# Patient Record
Sex: FEMALE | Race: WHITE | Employment: FULL TIME | URBAN - NONMETROPOLITAN AREA
[De-identification: names, ages, dates, MRNs, and addresses within clinical notes are randomized per-mention and may not be internally consistent; named-entity substitution may affect disease eponyms.]

---

## 2023-01-26 NOTE — H&P
[address] [place] [address]                       PREOPERATIVE HISTORY AND PHYSICAL    PATIENT NAME: Ino Madrid                       :        1975  MED REC NO:   [id number]                           ROOM:  ACCOUNT NO:   [de-identified]                           ADMIT DATE: 02/10/2023  PROVIDER:     Michaela Orosco. Erik Agrawal MD    Today's date 2023, 07:41 a.m. PLANNED PROCEDURES:  1. Right insertional Achilles tendonitis debridement repair. 2.  Right Ovidio's deformity resection. 3.  Right posterior calcaneal spur excision. 4.  Possible FHL tendon transfer to the calcaneus. PREOPERATIVE DIAGNOSES:  1. Right insertional Achilles tendonitis/tendinosis. 2.  Right Ovidio's deformity. 3.  Right posterior calcaneal spur. HISTORY:  A 52year-old with recalcitrant insertional Achilles tendon  pain, elected to proceed with surgery. PAST MEDICAL HISTORY:  Positive hypertension, positive nephrogenic  diabetes insipidus. MEDICATIONS:  Multiple. See chart for details. ALLERGIES:  PENICILLIN AND SULFA. NO ALLERGY TO METAL, LATEX. PAST SURGICAL HISTORY:  Multiple including two C-sections, lobectomy,  and appendectomy. REVIEW OF SYSTEMS:  MUSCULOSKELETAL:  Positive for swelling. NEUROLOGIC:  Negative numbness, tingling. SOCIAL HISTORY:  Negative smoking. FAMILY HISTORY:  Positive osteoarthritis and diabetes. PHYSICAL EXAMINATION:  VITAL SIGNS:  5 feet 4 inches. GENERAL APPEARANCE:  A well-developed, well-nourished female. Mood and  affect appropriate. Alert and oriented x3. HEAD, NECK, CHEST, ABDOMEN:  No obvious deformity. EXTREMITIES:  Right lower extremity neurovascularly intact. Tender  insertional Achilles tendon. IMPRESSION:  As above. PLAN:  1. To proceed with surgery as above. 2.  Postop instructions given.   3.  Percocet postop pain as well as Xarelto for DVT prophylaxis, sent to  the pharmacy and discussed with the patient. 4.  Followup appointments made. 5.  Postop instructions given. 6.  Nonweightbearing following the surgery. 7.  Resume preop diet. 8.  General with popliteal block, continue if available. 9.  Call for problems, questions, concerns.         Jordan Victoria MD    D: 01/26/2023 7:54:57       T: 01/26/2023 7:58:27     [email or web address]  Job#: [id number]     Doc#: [id number]

## 2023-02-03 NOTE — PROGRESS NOTES
PAT call attempted, patient unavailable, left message to please call us back at your earliest convenience; [phone]

## 2023-02-08 ENCOUNTER — ANESTHESIA EVENT (OUTPATIENT)
Dept: OPERATING ROOM | Age: 48
End: 2023-02-08
Payer: COMMERCIAL

## 2023-02-10 ENCOUNTER — HOSPITAL ENCOUNTER (OUTPATIENT)
Age: 48
Setting detail: OUTPATIENT SURGERY
Discharge: HOME | End: 2023-02-10
Attending: ORTHOPAEDIC SURGERY | Admitting: ORTHOPAEDIC SURGERY
Payer: COMMERCIAL

## 2023-02-10 ENCOUNTER — ANESTHESIA (OUTPATIENT)
Dept: OPERATING ROOM | Age: 48
End: 2023-02-10
Payer: COMMERCIAL

## 2023-02-10 VITALS
HEART RATE: 72 BPM | DIASTOLIC BLOOD PRESSURE: 55 MMHG | SYSTOLIC BLOOD PRESSURE: 103 MMHG | OXYGEN SATURATION: 99 % | TEMPERATURE: 97.2 F | RESPIRATION RATE: 13 BRPM | BODY MASS INDEX: 36.37 KG/M2 | HEIGHT: 64 IN | WEIGHT: 213 LBS

## 2023-02-10 PROCEDURE — 7100000010 HC PHASE II RECOVERY - FIRST 15 MIN: Performed by: ORTHOPAEDIC SURGERY

## 2023-02-10 PROCEDURE — 64445 NJX AA&/STRD SCIATIC NRV IMG: CPT | Performed by: ANESTHESIOLOGY

## 2023-02-10 PROCEDURE — 6360000002 HC RX W HCPCS: Performed by: ANESTHESIOLOGY

## 2023-02-10 PROCEDURE — 3600000013 HC SURGERY LEVEL 3 ADDTL 15MIN: Performed by: ORTHOPAEDIC SURGERY

## 2023-02-10 PROCEDURE — 6360000002 HC RX W HCPCS: Performed by: ORTHOPAEDIC SURGERY

## 2023-02-10 PROCEDURE — 2709999900 HC NON-CHARGEABLE SUPPLY: Performed by: ORTHOPAEDIC SURGERY

## 2023-02-10 PROCEDURE — 7100000001 HC PACU RECOVERY - ADDTL 15 MIN: Performed by: ORTHOPAEDIC SURGERY

## 2023-02-10 PROCEDURE — 2720000010 HC SURG SUPPLY STERILE: Performed by: ORTHOPAEDIC SURGERY

## 2023-02-10 PROCEDURE — 7100000000 HC PACU RECOVERY - FIRST 15 MIN: Performed by: ORTHOPAEDIC SURGERY

## 2023-02-10 PROCEDURE — 6360000002 HC RX W HCPCS

## 2023-02-10 PROCEDURE — 3700000001 HC ADD 15 MINUTES (ANESTHESIA): Performed by: ORTHOPAEDIC SURGERY

## 2023-02-10 PROCEDURE — 3700000000 HC ANESTHESIA ATTENDED CARE: Performed by: ORTHOPAEDIC SURGERY

## 2023-02-10 PROCEDURE — 2500000003 HC RX 250 WO HCPCS

## 2023-02-10 PROCEDURE — C1713 ANCHOR/SCREW BN/BN,TIS/BN: HCPCS | Performed by: ORTHOPAEDIC SURGERY

## 2023-02-10 PROCEDURE — 6370000000 HC RX 637 (ALT 250 FOR IP): Performed by: ORTHOPAEDIC SURGERY

## 2023-02-10 PROCEDURE — 2580000003 HC RX 258: Performed by: ORTHOPAEDIC SURGERY

## 2023-02-10 PROCEDURE — 7100000011 HC PHASE II RECOVERY - ADDTL 15 MIN: Performed by: ORTHOPAEDIC SURGERY

## 2023-02-10 PROCEDURE — 3600000003 HC SURGERY LEVEL 3 BASE: Performed by: ORTHOPAEDIC SURGERY

## 2023-02-10 RX ORDER — SERTRALINE HYDROCHLORIDE 100 MG/1
150 TABLET, FILM COATED ORAL DAILY
COMMUNITY

## 2023-02-10 RX ORDER — FENTANYL CITRATE 50 UG/ML
25 INJECTION, SOLUTION INTRAMUSCULAR; INTRAVENOUS EVERY 5 MIN PRN
Status: DISCONTINUED | OUTPATIENT
Start: 2023-02-10 | End: 2023-02-10 | Stop reason: HOSPADM

## 2023-02-10 RX ORDER — TIRZEPATIDE 7.5 MG/.5ML
7.5 INJECTION, SOLUTION SUBCUTANEOUS WEEKLY
COMMUNITY

## 2023-02-10 RX ORDER — LEVOTHYROXINE SODIUM 0.1 MG/1
100 TABLET ORAL DAILY
COMMUNITY

## 2023-02-10 RX ORDER — BUPROPION HYDROCHLORIDE 300 MG/1
300 TABLET ORAL EVERY MORNING
COMMUNITY

## 2023-02-10 RX ORDER — HYDROCHLOROTHIAZIDE 50 MG/1
50 TABLET ORAL DAILY
COMMUNITY

## 2023-02-10 RX ORDER — PROPOFOL 10 MG/ML
INJECTION, EMULSION INTRAVENOUS PRN
Status: DISCONTINUED | OUTPATIENT
Start: 2023-02-10 | End: 2023-02-10 | Stop reason: SDUPTHER

## 2023-02-10 RX ORDER — SPIRONOLACTONE 50 MG/1
50 TABLET, FILM COATED ORAL DAILY
COMMUNITY

## 2023-02-10 RX ORDER — GENTAMICIN SULFATE 40 MG/ML
INJECTION, SOLUTION INTRAMUSCULAR; INTRAVENOUS PRN
Status: DISCONTINUED | OUTPATIENT
Start: 2023-02-10 | End: 2023-02-10 | Stop reason: ALTCHOICE

## 2023-02-10 RX ORDER — KETOROLAC TROMETHAMINE 30 MG/ML
INJECTION, SOLUTION INTRAMUSCULAR; INTRAVENOUS PRN
Status: DISCONTINUED | OUTPATIENT
Start: 2023-02-10 | End: 2023-02-10 | Stop reason: SDUPTHER

## 2023-02-10 RX ORDER — GLYCOPYRROLATE 0.2 MG/ML
INJECTION INTRAMUSCULAR; INTRAVENOUS PRN
Status: DISCONTINUED | OUTPATIENT
Start: 2023-02-10 | End: 2023-02-10 | Stop reason: SDUPTHER

## 2023-02-10 RX ORDER — LIDOCAINE HYDROCHLORIDE 20 MG/ML
INJECTION, SOLUTION EPIDURAL; INFILTRATION; INTRACAUDAL; PERINEURAL PRN
Status: DISCONTINUED | OUTPATIENT
Start: 2023-02-10 | End: 2023-02-10 | Stop reason: SDUPTHER

## 2023-02-10 RX ORDER — ERGOCALCIFEROL 1.25 MG/1
5000 CAPSULE ORAL DAILY
COMMUNITY

## 2023-02-10 RX ORDER — METFORMIN HYDROCHLORIDE 500 MG/1
500 TABLET, EXTENDED RELEASE ORAL
COMMUNITY

## 2023-02-10 RX ORDER — FENTANYL CITRATE 50 UG/ML
50 INJECTION, SOLUTION INTRAMUSCULAR; INTRAVENOUS EVERY 5 MIN PRN
Status: DISCONTINUED | OUTPATIENT
Start: 2023-02-10 | End: 2023-02-10 | Stop reason: HOSPADM

## 2023-02-10 RX ORDER — SODIUM CHLORIDE 9 MG/ML
INJECTION, SOLUTION INTRAVENOUS PRN
Status: DISCONTINUED | OUTPATIENT
Start: 2023-02-10 | End: 2023-02-10 | Stop reason: HOSPADM

## 2023-02-10 RX ORDER — METOPROLOL SUCCINATE 25 MG/1
25 TABLET, EXTENDED RELEASE ORAL DAILY
COMMUNITY

## 2023-02-10 RX ORDER — ROCURONIUM BROMIDE 10 MG/ML
INJECTION, SOLUTION INTRAVENOUS PRN
Status: DISCONTINUED | OUTPATIENT
Start: 2023-02-10 | End: 2023-02-10 | Stop reason: SDUPTHER

## 2023-02-10 RX ORDER — SODIUM CHLORIDE 9 MG/ML
INJECTION, SOLUTION INTRAVENOUS CONTINUOUS
Status: DISCONTINUED | OUTPATIENT
Start: 2023-02-10 | End: 2023-02-10 | Stop reason: HOSPADM

## 2023-02-10 RX ORDER — SODIUM CHLORIDE 0.9 % (FLUSH) 0.9 %
5-40 SYRINGE (ML) INJECTION PRN
Status: DISCONTINUED | OUTPATIENT
Start: 2023-02-10 | End: 2023-02-10 | Stop reason: HOSPADM

## 2023-02-10 RX ORDER — ONDANSETRON 2 MG/ML
INJECTION INTRAMUSCULAR; INTRAVENOUS PRN
Status: DISCONTINUED | OUTPATIENT
Start: 2023-02-10 | End: 2023-02-10 | Stop reason: SDUPTHER

## 2023-02-10 RX ORDER — ROPIVACAINE HYDROCHLORIDE 5 MG/ML
INJECTION, SOLUTION EPIDURAL; INFILTRATION; PERINEURAL
Status: COMPLETED | OUTPATIENT
Start: 2023-02-10 | End: 2023-02-10

## 2023-02-10 RX ORDER — DEXAMETHASONE SODIUM PHOSPHATE 10 MG/ML
INJECTION, EMULSION INTRAMUSCULAR; INTRAVENOUS PRN
Status: DISCONTINUED | OUTPATIENT
Start: 2023-02-10 | End: 2023-02-10 | Stop reason: SDUPTHER

## 2023-02-10 RX ORDER — OXYCODONE HYDROCHLORIDE AND ACETAMINOPHEN 5; 325 MG/1; MG/1
1 TABLET ORAL ONCE
Status: COMPLETED | OUTPATIENT
Start: 2023-02-10 | End: 2023-02-10

## 2023-02-10 RX ORDER — SODIUM CHLORIDE 0.9 % (FLUSH) 0.9 %
5-40 SYRINGE (ML) INJECTION EVERY 12 HOURS SCHEDULED
Status: DISCONTINUED | OUTPATIENT
Start: 2023-02-10 | End: 2023-02-10 | Stop reason: HOSPADM

## 2023-02-10 RX ORDER — MIDAZOLAM HYDROCHLORIDE 1 MG/ML
INJECTION INTRAMUSCULAR; INTRAVENOUS PRN
Status: DISCONTINUED | OUTPATIENT
Start: 2023-02-10 | End: 2023-02-10 | Stop reason: SDUPTHER

## 2023-02-10 RX ADMIN — MIDAZOLAM 2 MG: 1 INJECTION INTRAMUSCULAR; INTRAVENOUS at 07:36

## 2023-02-10 RX ADMIN — ONDANSETRON 4 MG: 2 INJECTION INTRAMUSCULAR; INTRAVENOUS at 09:38

## 2023-02-10 RX ADMIN — ROPIVACAINE HYDROCHLORIDE 20 ML: 5 INJECTION, SOLUTION EPIDURAL; INFILTRATION; PERINEURAL at 07:35

## 2023-02-10 RX ADMIN — GLYCOPYRROLATE 0.2 MG: 0.2 INJECTION, SOLUTION INTRAMUSCULAR; INTRAVENOUS at 07:47

## 2023-02-10 RX ADMIN — ROCURONIUM BROMIDE 40 MG: 50 INJECTION INTRAVENOUS at 08:59

## 2023-02-10 RX ADMIN — ROCURONIUM BROMIDE 50 MG: 50 INJECTION INTRAVENOUS at 07:47

## 2023-02-10 RX ADMIN — SODIUM CHLORIDE: 9 INJECTION, SOLUTION INTRAVENOUS at 07:33

## 2023-02-10 RX ADMIN — Medication 2000 MG: at 07:56

## 2023-02-10 RX ADMIN — LIDOCAINE HYDROCHLORIDE 40 MG: 20 INJECTION, SOLUTION EPIDURAL; INFILTRATION; INTRACAUDAL; PERINEURAL at 07:47

## 2023-02-10 RX ADMIN — KETOROLAC TROMETHAMINE 30 MG: 30 INJECTION, SOLUTION INTRAMUSCULAR; INTRAVENOUS at 09:36

## 2023-02-10 RX ADMIN — PROPOFOL 200 MG: 10 INJECTION, EMULSION INTRAVENOUS at 07:47

## 2023-02-10 RX ADMIN — DEXAMETHASONE SODIUM PHOSPHATE 10 MG: 10 INJECTION, EMULSION INTRAMUSCULAR; INTRAVENOUS at 07:47

## 2023-02-10 RX ADMIN — OXYCODONE AND ACETAMINOPHEN 1 TABLET: 5; 325 TABLET ORAL at 11:13

## 2023-02-10 ASSESSMENT — PAIN SCALES - GENERAL: PAINLEVEL_OUTOF10: 7

## 2023-02-10 ASSESSMENT — PAIN DESCRIPTION - ORIENTATION: ORIENTATION: RIGHT

## 2023-02-10 ASSESSMENT — PAIN DESCRIPTION - DESCRIPTORS: DESCRIPTORS: ACHING

## 2023-02-10 ASSESSMENT — PAIN DESCRIPTION - LOCATION: LOCATION: OTHER (COMMENT)

## 2023-02-10 ASSESSMENT — PAIN - FUNCTIONAL ASSESSMENT: PAIN_FUNCTIONAL_ASSESSMENT: 0-10

## 2023-02-10 NOTE — BRIEF OP NOTE
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

## 2023-02-10 NOTE — INTERVAL H&P NOTE
Update History & Physical    The patient's History and Physical of today was reviewed with the patient and I examined the patient. There was no change. The surgical site was confirmed by the patient and me. Plan: The risks, benefits, expected outcome, and alternative to the recommended procedure have been discussed with the patient. Patient understands and wants to proceed with the procedure.      Electronically signed by Lalo Vaca MD on 2/10/2023 at 7:39 AM

## 2023-02-10 NOTE — ANESTHESIA PRE PROCEDURE
Department of Anesthesiology  Preprocedure Note       Name:  Franko Harrell   Age:  52 y.o.  :  1975                                          MRN:  [id number]         Date:  2/10/2023      Surgeon: Sridhar Baeza):  Gilma Sofia MD    Procedure: Procedure(s):  Right Achilles Tendon Debridement & Repair Haglunds Resection Posterior Calcaneal Spur Excision    Medications prior to admission:   Prior to Admission medications    Medication Sig Start Date End Date Taking?  Authorizing Provider   levothyroxine (SYNTHROID) 100 MCG tablet Take 100 mcg by mouth Daily   Yes Historical Provider, MD   metoprolol succinate (TOPROL XL) 25 MG extended release tablet Take 25 mg by mouth daily   Yes Historical Provider, MD   hydroCHLOROthiazide (HYDRODIURIL) 50 MG tablet Take 50 mg by mouth daily   Yes Historical Provider, MD   spironolactone (ALDACTONE) 50 MG tablet Take 50 mg by mouth daily   Yes Historical Provider, MD   buPROPion (WELLBUTRIN XL) 300 MG extended release tablet Take 300 mg by mouth every morning   Yes Historical Provider, MD   sertraline (ZOLOFT) 100 MG tablet Take 150 mg by mouth daily   Yes Historical Provider, MD   Tirzepatide (MOUNJARO) 7.5 MG/0.5ML SOPN SC injection Inject 7.5 mg into the skin once a week   Yes Historical Provider, MD   metFORMIN (GLUCOPHAGE-XR) 500 MG extended release tablet Take 500 mg by mouth daily (with breakfast)   Yes Historical Provider, MD   vitamin D (ERGOCALCIFEROL) 1.25 MG (76595 UT) CAPS capsule Take 5,000 Units by mouth daily   Yes Historical Provider, MD       Current medications:    Current Facility-Administered Medications   Medication Dose Route Frequency Provider Last Rate Last Admin    0.9 % sodium chloride infusion   IntraVENous Continuous Gilma Sofia MD        ceFAZolin (ANCEF) 2000 mg in 0.9% sodium chloride 50 mL IVPB  2,000 mg IntraVENous Once Gilma Sofia MD         Facility-Administered Medications Ordered in Other Encounters   Medication Dose Route Frequency Provider Last Rate Last Admin    midazolam (VERSED) injection   IntraVENous PRN Trino Ramos, APRN - CRNA   2 mg at 02/10/23 0736       Allergies: Allergies   Allergen Reactions    Pcn [Penicillins] Rash    Sulfa Antibiotics Rash       Problem List:  There is no problem list on file for this patient. Past Medical History:        Diagnosis Date    Anxiety     Hypertension     Nephrogenic diabetes insipidus ([address]     Status post phlebectomy     Thyroid disease        Past Surgical History:        Procedure Laterality Date    APPENDECTOMY       SECTION       SECTION  2000    TUBAL LIGATION         Social History:    Social History     Tobacco Use    Smoking status: Never    Smokeless tobacco: Never   Substance Use Topics    Alcohol use: Not Currently                                Counseling given: Not Answered      Vital Signs (Current):   Vitals:    23 1047 02/10/23 0652   BP:  (!) 116/6   Pulse:  83   Resp:  16   Temp:  97.2 °F (36.2 °C)   TempSrc:  Temporal   SpO2:  95%   Weight: 210 lb (95.3 kg) 213 lb (96.6 kg)   Height: 4' 6\" (1.372 m) 5' 4\" (1.626 m)                                              BP Readings from Last 3 Encounters:   02/10/23 (!) 116/6       NPO Status: Time of last liquid consumption:                         Time of last solid consumption:                         Date of last liquid consumption: 23                        Date of last solid food consumption: 23    BMI:   Wt Readings from Last 3 Encounters:   02/10/23 213 lb (96.6 kg)     Body mass index is 36.56 kg/m². CBC: No results found for: WBC, RBC, HGB, HCT, MCV, RDW, PLT    CMP: No results found for: NA, K, CL, CO2, BUN, CREATININE, GFRAA, AGRATIO, LABGLOM, GLUCOSE, GLU, PROT, CALCIUM, BILITOT, ALKPHOS, AST, ALT    POC Tests: No results for input(s): POCGLU, POCNA, POCK, POCCL, POCBUN, POCHEMO, POCHCT in the last 72 hours.     Coags: No results found for: PROTIME, INR, APTT    HCG (If Applicable): No results found for: PREGTESTUR, PREGSERUM, HCG, HCGQUANT     ABGs: No results found for: PHART, PO2ART, VBA2YME, OZL2UTL, BEART, G2BVICBN     Type & Screen (If Applicable):  No results found for: LABABO, LABRH    Drug/Infectious Status (If Applicable):  No results found for: HIV, HEPCAB    COVID-19 Screening (If Applicable): No results found for: COVID19        Anesthesia Evaluation  Patient summary reviewed  Airway: Mallampati: II  TM distance: >3 FB   Neck ROM: full  Mouth opening: > = 3 FB   Dental:          Pulmonary:                              Cardiovascular:    (+) hypertension:,                   Neuro/Psych:               GI/Hepatic/Renal:             Endo/Other:    (+) hypothyroidism::., .                 Abdominal:             Vascular: Other Findings:           Anesthesia Plan      general and regional     ASA 2       Induction: intravenous. MIPS: Prophylactic antiemetics administered. Anesthetic plan and risks discussed with patient and spouse. Plan discussed with CRNA. Luda Monae.  [address] [place] [address]   2/10/2023

## 2023-02-10 NOTE — DISCHARGE INSTRUCTIONS
[address], [address] [place] [address] Avenue  [facility] [facility], 100 [facility]  [phone]     Fax: [phone]    Irvine Richard. César Costa M.D., M.S. Orthopaedic Surgeon specializing in Foot and Ankle Surgery    Patient Name: [address] [place] [address] Avenue AND ANKLE SURGERY     These are some reminders to help you with the post-operative course. PLEASE READ THIS OVER AGAIN NOW. If there is any part of the instructions which you don't understand, please ask me or the nurse to help clarify any questions you might have. ELEVATION     Following surgery, you should keep your foot up and elevated \"toes above the nose\" for 48 hours. This is very important to minimize your healing time and to help control the pain and swelling. To do this, most people need to lie in bed with their foot elevated on 2 or 3 pillows or cushions or lie on sofa with their foot hanging over the backrest of the couch. You may put the foot down to get something to eat or to use the washroom, but for the first two days you should try to keep the time your foot is \"down\" (below your heart) to a minimum (10 minutes or less less each time your foot is down). After the initial period of constant elevation (2 days), you can gradually become more active. You can allow your foot to become more dependent (down below your heart) more frequently, but I encourage you to still elevate your foot as much as possible for the first 10 to 14 days. If you are watching TV or reading a book, the best use of your time is to keep the foot up and elevated, toes above the nose. The better you control the swelling with elevation, the more comfortable you will be, the quicker the wounds will heal, and the less risk of blood clots forming in the leg. As you increase your activity, you need to start to \"listen\" to your foot and it will tell you when you need to get off your feet and elevate them.   During the initial postoperative period it is best to keep your foot elevated as much as possible and when starting to be more active let pain be your guide. ICE     Generally, you should not use ice after surgery if you have a nerve block and the foot or ankle is numb. If you did not have a nerve block or the feeling in the foot and ankle have returned following a nerve block then you may use ice to help control the pain and the swelling. Ice should never be applied directly to the skin. A plastic bag should be placed on the dressing before you apply the ice so that the dressing doesnt get wet. You may ice 30 minutes at a time every 2 hours. A frozen bag of peas or Ziploc bags filled with ice make good ice packs. Most pharmacies sell gel packs designed to be frozen and reused. Keep in mind that with a very bulky dressing or splint, the ice may not penetrate very well and will be of limited value. Also, elevating your foot during the first 48 is much more important than using ice! INCENTIVE SPIROMETER     You should receive an incentive spirometer (a plastic breathing device) and instructions how to use it after your surgery. The device requires you to suck on a plastic mouth piece to raise a plastic float within the device. This exercise should be repeated 10 times every hour you are awake for the first two days after you leave the hospital.  This exercise is intended to open up the lungs and prevent pneumonia. For the first two to three days following surgery, the most common reason to develop a fever is from the lungs not fully expanding when you breath. Work on this to help prevent this problem. Remember, An ounce of prevention is worth a pound of cure.         BLOOD CLOT PREVENTION - EXERCISES     Blood clots in the leg can be very serious and even life threatening. Fortunately, the risk of this complication in foot and ankle surgery is quite low, especially compared to other surgeries.   To decrease your risk everyone after their surgery needs to keep your operative leg up and elevated, toes above the nose as much as possible. Pumping the ankle up and down on the non-operative side helps keep the blood circulating in your legs and further decrease the risks as does wearing a stocking on the leg (HANK hose)addition to these three recommendations, it is recommended that you do: You should pump the toes up and down on the operated side, ten times every commercial or every 15 minutes when awake for the first 2 days Moving moving the toes up and down even a little will help keep blood moving in the leg and help prevent blood clots. With a nerve block often times you cant move the toes until the block wears off. This is OK and expected, just start moving the toes once the ability to do so returns. DANGLE EXERCISE     If you have not had a nerve block, you should start dangling your foot the second day after your surgery. If you had a nerve block and then you should wait until after the nerve block has worn off and you can feel your foot. Initially, when you put your foot down many people will feel like there is an explosion in their foot as the blood rushes into it. This exercise is designed to make your foot feel better when you have to put it down as you become more active. This exercise also helps keep the nerves from becoming \"overly sensitive\". The \"dangle\" exercise is performed by simply lowering the leg below the level of the heart and letting it feel like it is filling up with blood. Once it starts becoming uncomfortable then you then bring the foot back up, toes above the nose. You can then wait a minute or so and then repeat the exercise. You may only be able to do this for 10 or 15 seconds initially, but the goal is to work up to 1 to 2 minutes or so.   You should do the exercise five times every hour for the first few hours of the day, until the severe pain that comes from putting your foot down goes away. After a few days of doing this exercise (three to five days), the painful feeling that \"the blood is rushing down into the foot\" should go away. This exercise will help prepare your foot so that your foot will tolerate being down for longer and longer periods of time. GENERAL ACTIVITY     Right after the surgery you need to keep the leg up an elevated for 48 hours. After two days, the swelling from the surgery will begin to decrease on its own (day 3 or 4) and it is not as critical to keep it constantly elevated and you can gradually increase your activity level. During the first 7 to 10 days you are relatively home bound. During this time, you are going to want to be in a situation that if you are up and ambulating and you notice signs of swelling or increasing pain, you can easily and quickly stop what you are doing, lie down and elevate the leg, toes above the nose for 20 minutes or so. After 10 days, you will be able to leave the foot down for longer and longer periods and it is safer at that point to travel outside the home. DRIVING     You will not be able to drive immediately after your surgery. If the surgery has been performed on the right foot or ankle it is generally advised that you do not drive at least until you are seen at your first follow-up visit. How long it will be before you can drive will depend on the individual surgery performed. If the surgery was performed on the left leg then you can resume driving as soon as you are off the narcotics or just taking the narcotics at night. SHOWERING     Afterthe surgery, you will need to keep the dressing clean and dry. To shower you will need to cover the splint and while you can rest the foot on the floor when showering, you should not put your full weight on it unless you have been instructed that it is OK to do so.   To make sure water doesnt get down into the dressing you should wrap the top of the splint with a washcloth or towel and then wrap the leg in a plastic bag and seal it with 3 rubber bands spaced an inch or so apart from each other so if the water gets by on of the rubber bands it wont get by the others. If water does get into the splint, if it is a small amount of water you can use a hair dryer on cool to try and dry it out. If more than a little water gets in the splint with showering you will need to come in the next business/office day and have the splint removed and another dry splint applied. To avoid the possibility of getting the splint wet and needing it to be changed some people will choose to just take sponge baths while they are in the splint. Another option would be a commercially available cast covers which are reusable and work well. These can be found at stores that carry medical supplies. Whichever method you choose, it is important to keep the dressing clean and dry to avoid an infection. BANDAGE AND BLEEDING     Bleeding through the dressing happens quite commonly. This is blood oozing from the incisions that occurs for the first 1-2 hours after the operations. The bleeding usually stops at the incision sites, but continues to slowly seep to the surface of the bandage. Most of the time the actual bleeding has stopped by the time you see the drainage. If there is profuse bleeding, this is something we need know about and you need to call the office or the physician-on-call. ANTIBIOTICS     Intravenous antibiotics will be given through your IV prior to the surgical procedure to help prevent infection. Generally, after the surgery you will receive additional intravenous antibiotics. When you go home unless there is a special circumstance, the current recommendation is that patients not be placed on additional antibiotics.   The medical evidence is that additional oral antibiotics does not lower the risk of infection (there is always a risk), but make any resulting infections more serious. MEDICATIONS - STANDARD\PAIN PRESCRIPTIONS (Standard)    Percocet (oxycodone), Vicodin (hydrocodone), Tylenol #3 (Tylenol with codeine), or Ultram (tramadol)     Controlling your pain following the surgery is very important and to help with that you will generally be prescribed a narcotic pain medication. Make sure you have the prescription before you leave the hospital.  If you have not had a nerve block it is generally best to take the medication as prescribed at least initially. You will take less pain medication if you take it around the clock for the first few days. If you have had a nerve block make sure you take the pain medication before the block is scheduled to wear off because the pain will come on very quickly. If the foot or ankle was numbed up for the surgery and you dont feel any pain, it is generally still a good idea to take the pain medication before you go to bed so you dont wake up with severe pain. Be sure that you are alert and have no difficulty breathing before taking the medication. **MAY TAKE PERCOCET AGAIN AT 5PM TONIGHT**    BLOOD CLOT PREVENTION (Standard)    ASPIRIN 325mg (enteric coated) twice a day with food for 30 days     If you or a family member has had problems with blood clots in the legs you need to let us know. For patients following surgery, starting the day after surgery you should take aspirin twice a day. You will need to go to the drug store to get this. Bufferin or other types of aspirin are okay. Tylenol is not aspirin. If the aspirin upsets your stomach you can use an enteric coated Baby Aspirin (81mg) twice a day as an alternative as this does add some protection. If you are having significant stomach upset then stop the aspirin therapy. If you have been prescribed Toradol (ketorolac) then you should only start the aspirin after the Toradol (ketorolac) is complete.       STOOL SOFTENER (Standard)    Zandra-Colace, Colace, or Senekot-S (stool softener and laxative) as directed     It is recommend you take an over the counter stool softener such) starting the day after your surgery to prevent constipation. You will need to go to the drug store and get this. You may stop taking this after you have regular bowel movements. OVER THE COUNTER ANTI-INFLAMMATORY MEDICATIONS (Standard)     For non-bony surgery, non-fracture surgery, or non-fusion surgery and you are not on blood thinner medications or Toradol (ketorolac) you may take an over the counter anti-inflammatory medication like Advil, Motrin, or Ibuprofen along with the narcotic pain medications. The usual recommended dosage is 600mg by mouth with food three times a day or every 8 hours. It is not recommended to take these types of medications if you are on blood thinner medications like Xarelto (rivaroxaban) or Toradol (ketorolac) because of concerns about bleeding. It is not recommended that you take these medications for 6 months after a bone, fracture (broken bone), or fusion (arthrodesis) surgery because these medications slow bone healing and lead to an increase risk of non-union or failure of bone to heal or fusions to fuse. This can lead to failure of these types of surgeries. ADDITIONAL MEDICATIONS (Non-Standard)\SPECIAL CIRCUMSTANCES      XARELTO - For additional blood clot prevention in high risk patients      Patients who are at higher risk for blood clots in the leg, another medication will be prescribed Xarelto (rivaroxaban) 10 mg tablets and this will be taken once a day staring the day after the surgery for 12 days (if you are in the hospital you will receive the medication there so you would take it starting the day after you leave the hospital). Once the Xarelto is complete, you should start the aspirin therapy as described above.   During the time you are taking this medication you should not take other blood thinning medications. Xarelto is a good medication, but it is new, so it is expensive. For those patients at higher risk needing Xarelto, we will attempt to get your insurance to approve it (most will) prior to the surgery. If it is approved, it will be called into your pharmacy and you will have to pick it up. If it is not approved you will need to  pay for it yourself or start aspirin therapy and assume a higher risk of developing blood clots in the leg. OTHER MEDICATION CONSIDERATIONS    INFLAMMATORY MEDICATIONS (NSAIDS)OR OVER-THE-COUNTER    FOR PATIENTS WHO HAVE BONE SURGERY, A FUSION (ARTHRODESIS), OR FRACTURE SURGERY:  Non-Steroidal Anti-inflammatory Drugs (NSAIDs) are a very common type of medication used to treat arthritis and pain. To increase the probability that a fracture or broken bone will heal or a fusion or arthrodesis will take, it is recommended that you do NOT take these types of medications for 6 months following the surgery. Non-Steroidal Anti-inflammatory Drugs (NSAIDs) are a very common type of medication used to treat arthritis and pain. There are hundreds of different prescription NSAIDs on the market today available by prescription and many available over-the-counter. These medications are generally stopped a week before surgery because of concerns about their increasing risks of bleeding during surgery. These medications can be resumed the day following surgery unless you have been prescribed a blood thinning medication other than aspirin. It is not recommended that you take these medications for 6 months after a bone, fracture (broken bone), or fusion (arthrodesis) surgery because these medications slow bone healing and lead to an increase risk of non-union or failure of bone to heal or fusions to fuse. This can lead to failure of these types of surgeries.           RESUMPTION OF PRE-OP MEDICATIONS     After surgery, you can resume most of the medications you may have stopped prior to the surgery. One exception is oral diabetic medications that were stopped prior to the surgery need to be restarted the second day after the surgery. If you fall into one of the categories above (bone, fracture, fusion, or arthrodesis surgery) NSAIDs or anti-inflammatory medications should not be restarted either. Blood thinner medications should be restarted unless you are on Xarelto (rivaroxaban). If you have questions about restarting your medications please ask. DRESSINGS AND SPLINTS      Posterior Plaster Splint - This dressing is designed to immobilize the ankle and rest the soft tissues. It is composed of a cotton like material wrapped in many layers around your leg to pad it and a plaster hard portion that runs down the bottom of the foot and up the back and sides of your leg. It is wrapped in an elastic bandage which runs from the ball of your foot up to the knee. This splint should be left on until we remove it and the office, usually on your first visit back. If the splint is too tight, you may loosen the ace bandage and tear the cotton padding in the in the front of the splint and spread the plaster side pieces apart. To really loosen the dressing you should see skin from the knee to the toes. This lets you know you have released all of the cotton padding. After you do this loosely rewrap the splint with the elastic bandage. If these measures do not relieve the tightness, please call me or contact the physician-on-call, [phone]. BEARING STATUS (Standard)    NON-WEIGHT BEARING with walker, crutches, or Roll-About-Walker     This means you should not put any weight on your foot and should keep it off the ground when walking with the crutches or walker. When brushing your teeth or sitting you may rest the cast or splint on the floor, but you should not put any weight through the foot or ankle. most surgeries you will be non-weight bearing for at least two weeks.  Below are descriptions of the weight bearing terms you may hear later. Unless you have been specifically told by me that you can walk on the leg, you need to be non-weight bearing until I see you back in the office. THINGS TO LOOK FOR:     Infection is one of the complications of surgery. Fortunately, the incidence of infection in foot and ankle surgery is quite low, but the key to dealing with infections is to recognize them early, within a few days as opposed to weeks. In the first few days after surgery, it is not unusual to have fevers. These are mild, you are usually not aware of them, and they are not caused by an infection, but by fluid accumulations in your lungs, termed atelectasis. This is especially common after a general anesthesia. This fluid build up in the lungs can give you fevers or become a pneumonia. To prevent this you should cough ten times and consciously take ten deep breaths every hour during the first few days after surgery to prevent this problem. Once you are up more and more active this fluid accumulation, usually isn't a problem. Infections after surgery will usually manifest themselves about six days after the operation. Generally the symptoms are the same sorts of things you would see in someone who you would think of being \"under the weather\" or sick. You may experience a sharp increase in pain, drainage from under the dressing, fevers (greater than 101.5 degrees), loss of appetite, and other symptoms you associate with someone being sick. If you experience any of these symptoms you should give us a call. Blood clots in the leg be very serious and even life threatening. Fortunately, the risk of this complication in foot and ankle surgery is quite low, but pumping the ankle up and down on the non-operative side will help \"keep blood circulating\" and further decrease the risks. Further, \"moving\" of the operated side ankle and toes will help.   What you are and are not allowed to do will be noted above. Blood clots in the legs will sometimes have no symptoms and therefore can't be detected without getting an ultrasound test of the leg, but often they will manifest themselves as a profound increased swelling or pain in the calf, severe Marciano horses in the calf, redness of the leg, shortness of breath or chest pain. If you are experiencing any of these symptoms you should give us a call. WHEN TO CALL ME:     After the surgery, there may be some residual numbness and tingling in the foot the day following your surgery as a result of the tourniquet or left over from the nerve block. This is not unexpected, however, if you experience any of the following you should call me:   *There is excessive bleeding (The blood is soaking through the dressing.)   *The foot is completely numb, and you did not have a nerve block   *The dressing feels too tight   *If you are in a plaster splint or a cast and it feels uncomfortable or too tight   *Temperatures above 101.5   *Numbness or discoloration of the toes (They should always be warm and pink.)   *Pain that is not controlled by the pain medications   *A sudden, profound increase in calf pain or leg swelling, this could indicate a blood clot. This is very serious! *Anything else \"that doesn't seem right\"    WHEN TO CALL 911: If you experience, chest pain or problems breathing after surgery do not you should immediately call 911 and proceed immediately to the nearest emergency room. Do not delay your care by trying to contact our office. FOLLOW-UP APPOINTMENT     Please call the office the day after surgery [phone], [phone]. [phone]) to give me a report on how everything is going. Please speak with Sahra Cheney, my medical assistant, or myself and just let us know that things are going well. If you are doing OK, it is all right to leave a voice mail message and let us know things are going as expected.   If I dont hear from you, I assume everything is going OK. If you do not have a follow-up appointment already scheduled, please call the office [phone]) to schedule your follow-up appointment for two and a half weeks following the surgery. Remember that someone is always available to assist you and answer questions. We want the surgery and the post-op course to go smoothly for you. It is better to call and ask a question than to lie in bed all night and worry about something! Efren Ventura!,    Jonh Bailey.  Joe Camarillo M.D., M.S.

## 2023-02-10 NOTE — PROGRESS NOTES
1005: Patient arrived to Phase I recovery via cart. Eyes closed but patient awakens to voice. Report received from Jasmina Cedeño, [facility] and Niesha Hoskins CRNA.  1006: VSS. Patient denies pain at this time. Dressing to R foot clean, dry and intact. Elevated with pillow support. 1010: VSS. 1015: VSS. Dr. Solange Balderas at bedside. 1020: VSS.   1025: VSS. HOB elevated. Ice water provided. Patient tolerated well. Continues to deny pain at this time. 1030: VSS. Patient continues to rest on cart with no complaints. 1035: VSS, patient completed Phase I recovery. 1036: Entered into Phase II recovery via cart.  present in room. Patient denies snack at this time. Water and juice provided. Call light placed within reach. 1105: Patient rates pain to tourniquet site on R thigh 7/10. Dr. Solange Balderas called and updated-order for Percocet given. Patient provided with more water and crackers. 1113: Percocet given per order-see MAR. Patient continues to rest on cart with no other complaints.  remains at side in room and call light in reach. 1140: Patient states pain has improved. Discharge instructions reviewed with patient and patient's . AVS provided for discharge. No questions or concerns voiced. IV removed without difficulty and band aid applied. Patient sat edge of bed with ease and dressed edge of bed with  present. 1153: Patient escorted to vehicle via wheelchair maintaining NWB status and discharged home in stable condition with .

## 2023-02-10 NOTE — ANESTHESIA PROCEDURE NOTES
Peripheral Block    Patient location during procedure: OR  Reason for block: post-op pain management and at surgeon's request  Start time: 2/10/2023 7:35 AM  End time: 2/10/2023 7:45 AM  Staffing  Performed: anesthesiologist   Anesthesiologist: Kymberly Vargas DO  Preanesthetic Checklist  Completed: patient identified, IV checked, site marked, risks and benefits discussed, surgical/procedural consents, equipment checked, pre-op evaluation, timeout performed, anesthesia consent given, oxygen available and monitors applied/VS acknowledged  Peripheral Block   Patient position: prone  Prep: ChloraPrep  Provider prep: sterile gloves and mask  Patient monitoring: cardiac monitor, continuous pulse ox, frequent blood pressure checks, IV access and responsive to questions  Block type: Sciatic  Popliteal  Laterality: right  Injection technique: single-shot  Guidance: ultrasound guided    Needle   Needle type: short-bevel   Needle gauge: 20 G  Needle localization: ultrasound guidance  Needle length: 10 cm  Assessment   Injection assessment: negative aspiration for heme, no paresthesia on injection and local visualized surrounding nerve on ultrasound  Paresthesia pain: none  Slow fractionated injection: yes  Hemodynamics: stable  Outcomes: uncomplicated and patient tolerated procedure well    Medications Administered  ropivacaine (NAROPIN) injection 0.5% - Perineural   20 mL - 2/10/2023 7:35:00 AM  dexamethasone 4 MG/ML - Perineural   4 mg - 2/10/2023 7:35:00 AM

## 2023-02-11 NOTE — OP NOTE
[address] [place] [address]                                OPERATIVE REPORT    PATIENT NAME: Ehsan ROJAS                     :        1975  MED REC NO:   [id number]                           ROOM:  ACCOUNT NO:   [de-identified]                           ADMIT DATE: 02/10/2023  PROVIDER:     Gabby Campo. Madelyn Amador MD    DATE OF PROCEDURE:  02/10/2023    PREOPERATIVE DIAGNOSES:  1. Right Achilles tendonitis/tendinosis. 2.  Right Ovidio's deformity. 3.  Right posterior calcaneal spur. POSTOPERATIVE DIAGNOSES:  1. Right Achilles tendonitis/tendinosis. 2.  Right Ovidio's deformity. 3.  Right posterior calcaneal spur. OPERATIONS PERFORMED:  1. Right Achilles tendon debridement and repair. 2.  Right Ovidio's deformity resection. 3.  Right posterior calcaneal spur excision. SURGEON:  Tristan Amador MD    ASSISTANT SURGEON:  James Diaz DPM.  Note Hammad Chester served as a first  assist throughout the procedure, helped in prepping and draping the  patient, positioning the patient, wound retraction, wound closure and  splint dressing application, was there for the entire procedure serving  as the first assist.    COMPLICATIONS:  None. SPECIMENS SENT:  None. TIME-OUT:  Performed. SITE:  Signed. PREOPERATIVE ANTIBIOTICS:  Given. ESTIMATED BLOOD LOSS:  1 mL/trace. HISTORY AND INDICATIONS:  The patient is a 49-year-old with recalcitrant  right insertional Achilles tendon pain, elected to proceed with surgery. Risks and benefits were explained, numbness over incision, infection,  wound healing problems, DVT, patient elected to proceed. PROCEDURE DESCRIPTION AND FINDINGS:  The patient was seen in the  preoperative area. Consent checked, leg marked, indicated wish to  proceed, went back to the operating room, transferred to the operating  table. General anesthetic induced. General analgesia throughout the  case. Right leg was prepped and draped. Time-out performed. The  patient was placed in prone position. Upper and lower extremities well  padded. Leg exsanguinated. Thigh tourniquet inflated. At the  conclusion of the case, thigh tourniquet was let down. Good capillary  refill and sensation returned to the toes. The ankle was brought to 90  degrees. A straight midline incision made through the skin,  subcutaneous tissues to the paratenon to the tendon. Approximately  dorsal 5% of the tendon was debrided and appeared to be tendonopathic. Straight midline incision was made in the tendon with gentle retraction  of the skin in order not to cause iatrogenic skin problems. Incision  down to the skin down into the first posterior calcaneal spur. The spur  removed from the posterior aspect of the heel using osteotome measuring  3 cm x 6 mm x 8 mm. The Ovidio deformity was resected measuring  approximately 3 cm x 4.5 cm x 1.5 cm. Chamfer cuts were made and a  small portion of bone resected as well and then digital palpation  revealed there overall appeared to be no evidence of significant bony  prominence. Fluoro lateral was obtained. It showed excellent resection  regard to the Ovidio deformity, posterior calcaneal spur, and image was  saved to be printed and placed in the chart and given to the patient at  the conclusion of the case. Following this, the tendon was debrided. Approximately 30% of the tendon debrided. The remaining tendon appeared  to be overall healthy. It was reattached and two suture anchors were  placed, one in the medial limb footprint, one in the lateral limb  footprint. Suture was brought up through the tendon with a four-throw  Krackow suture technique and a knot placed down the undersurface of the  tendon. This was done on the medial limb as well with a knot placed  under the surface not to cause impingement.   One of the sutures was  _____ suture anchor was tied together, the knot was sunk down into the  well overlying the suture anchor and fifth figure-of-eight footprint  restoration stitch was performed with the suture knot placed in the  undersurface of the tendon. Overall it appeared all in good position. Then, 0 Vicryl suture was done running through the tendon burying the  knots with multiple box sutures, 0 Vicryl box sutures made, simple  sutures were used in order to further restore the footprint and then the  paratenon closed with a 2-0 Vicryl running suture followed by 3-0  Monocryl for the skin, 3-0 nylon running closure of skin. Overall, the  patient appeared to tolerate the procedure well. Dressings were  applied. Tourniquet was let down. Good capillary refill and sensation  returned to the toes. The patient's foot was in the vesting equinus  position in order to aid in skin healing. A standard AO splint with a  foot plate as well as medial and lateral slabs placed and the patient  was transported to the PACU in good condition. Preoperatively spoke  with the  as well as with the patient. Xarelto to be started  postop day #2 for 12 days. The patient was instructed to keep it up and  elevated, be nonweightbearing with a splint and dressing intact unless  there is an issue or problem, to do toe crunch and DPT program 20 times  three times a day. Rolm Councilman, DPM, assisted throughout the procedure with  positioning, draping, retraction, wound closure, dressing, and splint  application.         Reza Diaz MD    D: 02/10/2023 10:33:07       T: 02/10/2023 10:39:16     [email or web address]  Job#: [id number]     Doc#: [id number]    CC:

## (undated) DEVICE — PADDING CAST W4XL144IN WHT COT SYN RL NONADHESIVE SOF-ROL

## (undated) DEVICE — PADDING CAST W6INXL4YD COT LO LINTING WYTEX

## (undated) DEVICE — SUTURE NONABSORBABLE MONOFILAMENT 4-0 FS-2 18 IN ETHILON 662H

## (undated) DEVICE — SUTURE PLN GUT SZ 4-0 L27IN ABSRB YELLOWISH TAN L19MM FS-2 H821H

## (undated) DEVICE — BANDAGE COMPR E SGL LAYERED CLSR BGE W/ CLP W4INXL15FT

## (undated) DEVICE — HEWSON SUTURE RETRIEVER: Brand: HEWSON SUTURE RETRIEVER

## (undated) DEVICE — SPLINT ORTH W5XL30IN PLSTR OF PARIS LO EXOTHERM SMOOTH

## (undated) DEVICE — SUTURE VCRL SZ 2-0 L27IN ABSRB UD L26MM SH 1/2 CIR J417H

## (undated) DEVICE — 2.0MM DRILL BIT W/QUICK CONNECT: Brand: PERI-LOC

## (undated) DEVICE — 3M™ COBAN™ NL STERILE NON-LATEX SELF-ADHERENT WRAP, 2084S, 4 IN X 5 YD (10 CM X 4,5 M), 18 ROLLS/CASE: Brand: 3M™ COBAN™

## (undated) DEVICE — GLOVE ORANGE PI 7   MSG9070

## (undated) DEVICE — DRAPE C ARM W36XL30IN RECTANG BND BG AND TAPE

## (undated) DEVICE — STOCKINETTE ORTH W9XL36IN COT 2 PLY HLLW FOR HANDLING LMB

## (undated) DEVICE — DRAPE,U/SHT,SPLIT,FILM,60X84,STERILE: Brand: MEDLINE

## (undated) DEVICE — SUTURE ETHBND EXCEL SZ 0 L30IN NONABSORBABLE GRN CT1 L36MM X424H

## (undated) DEVICE — DRESSING,GAUZE,XEROFORM,CURAD,5"X9",ST: Brand: CURAD

## (undated) DEVICE — Device

## (undated) DEVICE — PACK PROCEDURE SURG POD SC SRHP LF

## (undated) DEVICE — TETRA-FLEX CF WOVEN LATEX FREE ELASTIC BANDAGE 6" X 5.5 YD: Brand: TETRA-FLEX™CF

## (undated) DEVICE — GLOVE ORANGE PI 8   MSG9080

## (undated) DEVICE — GOWN,SIRUS,NON REINFRCD,LARGE,SET IN SL: Brand: MEDLINE

## (undated) DEVICE — GLOVE ORANGE PI 7 1/2   MSG9075

## (undated) DEVICE — SUTURE VCRL SZ 3-0 L27IN ABSRB UD FS-2 L19MM 1/2 CIR J423H

## (undated) DEVICE — SUTURE ETHBND EXCEL SZ 2-0 L30IN NONABSORBABLE GRN L26MM SH X833H